# Patient Record
Sex: MALE | Race: WHITE | HISPANIC OR LATINO
[De-identification: names, ages, dates, MRNs, and addresses within clinical notes are randomized per-mention and may not be internally consistent; named-entity substitution may affect disease eponyms.]

---

## 2019-01-29 PROBLEM — Z00.00 ENCOUNTER FOR PREVENTIVE HEALTH EXAMINATION: Status: ACTIVE | Noted: 2019-01-29

## 2019-02-05 ENCOUNTER — APPOINTMENT (OUTPATIENT)
Age: 39
End: 2019-02-05
Payer: MEDICAID

## 2019-02-05 VITALS
SYSTOLIC BLOOD PRESSURE: 120 MMHG | OXYGEN SATURATION: 96 % | HEIGHT: 75 IN | DIASTOLIC BLOOD PRESSURE: 70 MMHG | BODY MASS INDEX: 24.87 KG/M2 | WEIGHT: 200 LBS | HEART RATE: 75 BPM

## 2019-02-05 DIAGNOSIS — R10.33 PERIUMBILICAL PAIN: ICD-10-CM

## 2019-02-05 DIAGNOSIS — K42.9 UMBILICAL HERNIA W/OUT OBSTRUCTION OR GANGRENE: ICD-10-CM

## 2019-02-05 DIAGNOSIS — F17.200 NICOTINE DEPENDENCE, UNSPECIFIED, UNCOMPLICATED: ICD-10-CM

## 2019-02-05 PROCEDURE — 99203 OFFICE O/P NEW LOW 30 MIN: CPT

## 2019-02-05 NOTE — PLAN
[FreeTextEntry1] : After examination patient was explained about umbilical hernia. Surgical options explained in detail. Patient is not thinking about surgery right now and once awake and somewhat. He was advised to return to the office to make an appointment for surgery and  couple weeks prior to that date. Followup in the office as needed.

## 2019-02-05 NOTE — CONSULT LETTER
[Dear  ___] : Dear  [unfilled], [Consult Letter:] : I had the pleasure of evaluating your patient, [unfilled]. [Please see my note below.] : Please see my note below. [Consult Closing:] : Thank you very much for allowing me to participate in the care of this patient.  If you have any questions, please do not hesitate to contact me. [Sincerely,] : Sincerely, [FreeTextEntry3] : Robson Yeh MD, FACS\par [facility]\[place] [address]\par

## 2019-02-05 NOTE — PHYSICAL EXAM
[Abdominal Masses] : No abdominal masses [Abdomen Tenderness] : ~T ~M Abdominal tenderness [No Rash or Lesion] : No rash or lesion [Purpura] : no purpura  [Petechiae] : no petechiae [Skin Ulcer] : no ulcer [Skin Induration] : no induration [Alert] : alert [Oriented to Person] : oriented to person [Oriented to Place] : oriented to place [Oriented to Time] : oriented to time [Calm] : calm [de-identified] : normal [de-identified] : Small umbilical hernia with no evidence of incarceration or strangulation. [de-identified] : small umbilical hernia with some tenderness

## 2019-02-05 NOTE — REASON FOR VISIT
[Consultation] : a consultation visit [FreeTextEntry1] : Mr Montgomery was referred here today for umbilical pain

## 2019-02-05 NOTE — HISTORY OF PRESENT ILLNESS
[de-identified] : Patient presents to the office with a history of umbilical hernia. He has been symptomatic recently. He does work for [employer] and wishes heavy objects for workup.

## 2022-01-03 ENCOUNTER — EMERGENCY (EMERGENCY)
Facility: HOSPITAL | Age: 42
LOS: 0 days | Discharge: HOME | End: 2022-01-03
Attending: EMERGENCY MEDICINE | Admitting: EMERGENCY MEDICINE
Payer: MEDICAID

## 2022-01-03 VITALS
DIASTOLIC BLOOD PRESSURE: 76 MMHG | RESPIRATION RATE: 18 BRPM | TEMPERATURE: 98 F | WEIGHT: 205.03 LBS | SYSTOLIC BLOOD PRESSURE: 135 MMHG | OXYGEN SATURATION: 99 % | HEART RATE: 82 BPM

## 2022-01-03 DIAGNOSIS — M25.561 PAIN IN RIGHT KNEE: ICD-10-CM
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
## 2022-01-03 PROCEDURE — 99283 EMERGENCY DEPT VISIT LOW MDM: CPT

## 2022-01-03 NOTE — ED ADULT TRIAGE NOTE - CHIEF COMPLAINT QUOTE
pt complaining of right knee pain. pt sts he was playing basketball and stepped the wrong way twisting his right knee

## 2022-01-04 PROCEDURE — 73564 X-RAY EXAM KNEE 4 OR MORE: CPT | Mod: 26,RT

## 2022-01-04 NOTE — ED PROCEDURE NOTE - PROCEDURE DATE TIME, MLM
DISCHARGE:  Parent and patient given discharge instructions including prescriptions and suggested FU in 2-3 days with PCP but instructed to call ahead due to COVID precautions, voiced understanding. EDUCATION: Parent and patient educated on wound care, keeping wound dry x48 hrs, changing dressing 1-2 times a day, looking for signs of infection, checking neurovascular status, taking ATB as prescribed, wearing knee immobilizer during any activities or bending x 1 week, good hand/ cough hygiene, and self isolation and social distancing during [place], voiced understanding. 04-Jan-2022 00:48

## 2022-01-04 NOTE — ED PROVIDER NOTE - PHYSICAL EXAMINATION
GENERAL: Well-nourished, Well-developed. NAD.  HEAD: No visible or palpable bumps or hematomas. No ecchymosis behind ears B/L.  Neck: Supple. FROM  CVS: RRR. Normal S1,S2. No murmurs appreciated on auscultation   RESP: Lungs clear to auscultation B/L. No wheezing, rales, or rhonchi auscultated.  MSK: Extremities w/o deformity. No visible signs of trauma such as ecchymosis, erythema, or swelling. (+)R knee ttp, FROM. no gross deformity  Skin: Warm, Dry. No rashes or lesions. Good cap refill < 2 sec B/L.  EXT: Radial and pedal pulses present B/L.   Neuro: NVI

## 2022-01-04 NOTE — ED PROVIDER NOTE - NS ED ROS FT
Constitutional: No fever, chills.  Eyes:  No visual changes  ENMT:  No neck pain  Cardiac:  No chest pain  Respiratory:  No cough, SOB  MS:  (+)knee pain  Neuro:  No numbness/tingling or weakness  Skin:  No skin rash  Endocrine: No history of thyroid disease or diabetes.  Except as documented in the HPI,  all other systems are negative.

## 2022-01-04 NOTE — ED PROVIDER NOTE - CLINICAL SUMMARY MEDICAL DECISION MAKING FREE TEXT BOX
40 yo male with no PMH presents to the ER for RT knee pain s/p injury while playing basketball an hour pta. Pt states he pivoted, and felt his knee cap displace to the side, and he straightened his leg and knee cap returned to normal position. Now having pain the anterior knee. Pain with bending and straightening the knee. No other injury. NO blood thinners. No CP/SOB/abdomen pain/N/V/D/cough/URI/palpitations/calf tenderness/joint deformity. On exam ncat,  lungs ctab, heart regular s1s2, abdomen soft nt/nd +BS, no mass, Ext +mild tenderness to the right anterior knee, pain with flexion, patella is not dislocated (pt likely self reduced when he straightened his leg) no joint deformity, no shin/ankle/foot tenderness or deformity, distal pulses intact. XR done, no fx, no dislocation, mild effusion. Placed in knee brace, given crutch to help balance him while walking and recommend NSAIDs prn pain, RICE instructions and follow up with ortho as an outpatient. Return precautions given

## 2022-01-04 NOTE — ED PROVIDER NOTE - ATTENDING CONTRIBUTION TO CARE
42 yo male with no PMH presents to the ER for RT knee pain s/p injury while playing basketball an hour pta. Pt states he pivoted, and felt his knee cap displace to the side, and he straightened his leg and knee cap returned to normal position. Now having pain the anterior knee. Pain with bending and straightening the knee. No other injury. NO blood thinners. No CP/SOB/abdomen pain/N/V/D/cough/URI/palpitations/calf tenderness/joint deformity. On exam ncat,  lungs ctab, heart regular s1s2, abdomen soft nt/nd +BS, no mass, Ext +mild tenderness to the right anterior knee, pain with flexion, patella is not dislocated (pt likely self reduced when he straightened his leg) no joint deformity, no shin/ankle/foot tenderness or deformity, distal pulses intact. XR done, no fx, no dislocation, mild effusion. Placed in knee brace, given crutch to help balance him while walking and recommend NSAIDs prn pain, RICE instructions and follow up with ortho as an outpatient. Return precautions given    ALL: nkda  PMH denies  Meds denies  SH denies  PMD denies

## 2022-01-04 NOTE — ED PROVIDER NOTE - NSFOLLOWUPINSTRUCTIONS_ED_ALL_ED_FT
KNEE PAIN - Ambulatory Care     Knee Pain    AMBULATORY CARE:    Knee pain may start suddenly, or it may be a long-term problem. You may have pain on the side, front, or back of your knee. You may have knee stiffness and swelling. You may hear popping sounds or feel like your knee is giving way or locking up as you walk. You may feel pain when you sit, stand, walk, or climb up and down stairs. Knee pain can be caused by conditions such as obesity, inflammation, or strains or tears in ligaments or tendons.    Seek care immediately if:     Your pain is worse, even after treatment.       You cannot bend or straighten your leg completely.       The swelling around your knee does not go down even with treatment.      Your knee is painful and hot to the touch.     Contact your healthcare provider if:     You have questions or concerns about your condition or care.         Treatment will depend on the cause of your pain. You may need any of the following:     NSAIDs help decrease swelling and pain or fever. This medicine is available with or without a doctor's order. NSAIDs can cause stomach bleeding or kidney problems in certain people. If you take blood thinner medicine, always ask your healthcare provider if NSAIDs are safe for you. Always read the medicine label and follow directions.      Acetaminophen decreases pain and fever. It is available without a doctor's order. Ask how much to take and how often to take it. Follow directions. Read the labels of all other medicines you are using to see if they also contain acetaminophen, or ask your doctor or pharmacist. Acetaminophen can cause liver damage if not taken correctly. Do not use more than 4 grams (4,000 milligrams) total of acetaminophen in one day.       Prescription pain medicine may be given. Ask your healthcare provider how to take this medicine safely. Some prescription pain medicines contain acetaminophen. Do not take other medicines that contain acetaminophen without talking to your healthcare provider. Too much acetaminophen may cause liver damage. Prescription pain medicine may cause constipation. Ask your healthcare provider how to prevent or treat constipation.       Steroid injections may be given into your knee. Steroids reduce inflammation and pain.      Surgery may be used for some injuries, such as to repair a torn ACL.    What you can do to manage your symptoms:     Rest your knee so it can heal. Limit activities that increase your pain. Do low-impact exercises, such as walking or swimming.       Apply ice to help reduce swelling and pain. Use an ice pack, or put crushed ice in a plastic bag. Cover it with a towel before you apply it to your knee. Apply ice for 15 to 20 minutes every hour, or as directed.      Apply compression to help reduce swelling. Use a brace or bandage only as directed.      Elevate your knee to help decrease pain and swelling. Elevate your knee while you are sitting or lying down. Prop your leg on pillows to keep your knee above the level of your heart.      Prevent your knee from moving as directed. Your healthcare provider may put on a cast or splint. You may need to wear a leg brace to stabilize your knee. A leg brace can be adjusted to increase your range of motion as your knee heals.Hinged Knee Braces          What you can do to prevent knee pain:     Maintain a healthy weight. Extra weight increases your risk for knee pain. Ask your healthcare provider how much you should weigh. He or she can help you create a safe weight loss plan if you need to lose weight.      Exercise or train properly. Use the correct equipment for sports. Wear shoes that provide good support. Check your posture often as you exercise, play sports, or train for an event. This can help prevent stress and strain on your knees. Rest between sessions so you do not overwork your knees.    Follow up with your healthcare provider within 24 hours or as directed: Write down your questions so you remember to ask them during your visits.

## 2022-01-04 NOTE — ED PROVIDER NOTE - CARE PROVIDER_API CALL
Andrés Cortez)  Orthopaedic Surgery  [address] [place] [address]  Phone: [phone]  Fax: [phone]  Follow Up Time: 1-3 Days

## 2022-01-04 NOTE — ED PROCEDURE NOTE - ATTENDING CONTRIBUTION TO CARE
I was present for and supervised the key/critical aspects of the procedures performed during the care of the patient.--knee brace/immobilizer placement

## 2022-01-04 NOTE — ED PROVIDER NOTE - PATIENT PORTAL LINK FT
You can access the FollowMyHealth Patient Portal offered by [facility] by registering at the following website: [email or web address]. By joining [facility]’s FollowMyHealth portal, you will also be able to view your health information using other applications (apps) compatible with our system.

## 2022-01-31 ENCOUNTER — APPOINTMENT (OUTPATIENT)
Dept: INTERNAL MEDICINE | Facility: CLINIC | Age: 42
End: 2022-01-31

## 2022-12-14 NOTE — ED PROVIDER NOTE - OBJECTIVE STATEMENT
40 yo M no pmhx presenting to the ED for evaluation of R knee injury, pt was playing basketball 1 hour ago and pivoted, reports his patella laterally dislocated, pt extended leg and patella self reduced. Pt reports pain with ambulation. Denies any fall or direct trauma. Denies numbness/tingling, weakness. Suturegard Intro: Intraoperative tissue expansion was performed, utilizing the SUTUREGARD device, in order to reduce wound tension.